# Patient Record
Sex: MALE | Race: WHITE | NOT HISPANIC OR LATINO | Employment: UNEMPLOYED | URBAN - METROPOLITAN AREA
[De-identification: names, ages, dates, MRNs, and addresses within clinical notes are randomized per-mention and may not be internally consistent; named-entity substitution may affect disease eponyms.]

---
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
## 2025-01-06 ENCOUNTER — ANCILLARY PROCEDURE (OUTPATIENT)
Dept: GENERAL RADIOLOGY | Facility: CLINIC | Age: 20
End: 2025-01-06
Attending: NURSE PRACTITIONER

## 2025-01-06 ENCOUNTER — OFFICE VISIT (OUTPATIENT)
Dept: FAMILY MEDICINE | Facility: CLINIC | Age: 20
End: 2025-01-06

## 2025-01-06 ENCOUNTER — VIRTUAL VISIT (OUTPATIENT)
Dept: FAMILY MEDICINE | Facility: CLINIC | Age: 20
End: 2025-01-06

## 2025-01-06 ENCOUNTER — TELEPHONE (OUTPATIENT)
Dept: PEDIATRICS | Facility: CLINIC | Age: 20
End: 2025-01-06

## 2025-01-06 VITALS
HEART RATE: 79 BPM | BODY MASS INDEX: 16.91 KG/M2 | SYSTOLIC BLOOD PRESSURE: 110 MMHG | OXYGEN SATURATION: 97 % | TEMPERATURE: 99.1 F | WEIGHT: 135 LBS | DIASTOLIC BLOOD PRESSURE: 70 MMHG | RESPIRATION RATE: 22 BRPM

## 2025-01-06 DIAGNOSIS — R06.02 SHORTNESS OF BREATH: ICD-10-CM

## 2025-01-06 DIAGNOSIS — F17.290 VAPING NICOTINE DEPENDENCE, TOBACCO PRODUCT: ICD-10-CM

## 2025-01-06 DIAGNOSIS — J22 LOWER RESPIRATORY INFECTION: ICD-10-CM

## 2025-01-06 DIAGNOSIS — A37.90 PERTUSSIS-LIKE SYNDROME: ICD-10-CM

## 2025-01-06 DIAGNOSIS — J22 LOWER RESPIRATORY INFECTION: Primary | ICD-10-CM

## 2025-01-06 DIAGNOSIS — R05.1 ACUTE COUGH: Primary | ICD-10-CM

## 2025-01-06 PROCEDURE — 71046 X-RAY EXAM CHEST 2 VIEWS: CPT | Mod: TC | Performed by: RADIOLOGY

## 2025-01-06 PROCEDURE — 99207 PR NO CHARGE LOS: CPT | Mod: 95 | Performed by: NURSE PRACTITIONER

## 2025-01-06 PROCEDURE — 99214 OFFICE O/P EST MOD 30 MIN: CPT | Performed by: NURSE PRACTITIONER

## 2025-01-06 RX ORDER — PREDNISONE 20 MG/1
20 TABLET ORAL DAILY
Qty: 5 TABLET | Refills: 0 | Status: SHIPPED | OUTPATIENT
Start: 2025-01-06

## 2025-01-06 RX ORDER — AZITHROMYCIN 250 MG/1
TABLET, FILM COATED ORAL
Qty: 6 TABLET | Refills: 0 | Status: CANCELLED | OUTPATIENT
Start: 2025-01-06 | End: 2025-01-11

## 2025-01-06 RX ORDER — AZITHROMYCIN 250 MG/1
TABLET, FILM COATED ORAL
Qty: 6 TABLET | Refills: 0 | Status: SHIPPED | OUTPATIENT
Start: 2025-01-06 | End: 2025-01-11

## 2025-01-06 ASSESSMENT — ENCOUNTER SYMPTOMS
CHILLS: 0
FEVER: 0
WHEEZING: 1
COUGH: 1
SHORTNESS OF BREATH: 1
SORE THROAT: 0

## 2025-01-06 ASSESSMENT — PAIN SCALES - GENERAL: PAINLEVEL_OUTOF10: SEVERE PAIN (6)

## 2025-01-06 NOTE — PROGRESS NOTES
"  Assessment & Plan     1. Lower respiratory infection (Primary)    Nasal congestion and resp symptoms x 2 weeks.    Patient afebrile, ill-appearing but in NAD. Cervical adenopathy and LLL wheezing/rales noted.    CXR- No focal PNA noted. Appears c/w 2022 CXR. Awaiting radiologist review.      In the interim, start azithromycin and prednisone burst. I discussed with the patient/mother risks and benefits of the new medication prescribed including potential side effects.  The patient/mother had opportunity to ask questions and is comfortable with and interested in medications as prescribed.           - XR Chest 2 Views; Future  - azithromycin (ZITHROMAX) 250 MG tablet; Take 2 tablets (500 mg) by mouth daily for 1 day, THEN 1 tablet (250 mg) daily for 4 days.  Dispense: 6 tablet; Refill: 0  - predniSONE (DELTASONE) 20 MG tablet; Take 1 tablet (20 mg) by mouth daily.  Dispense: 5 tablet; Refill: 0    2. Vaping nicotine dependence, tobacco product  Pt interested but not ready to quit today, encouraged cessation.  Provided [place] [employer]uit [employer] info in AVS.    Follow-up if no improvement/symptoms worsen.    All questions/concerns addressed. Patient/mother stated understanding/agreement to plan of care.        Note: Chart documentation was done in part with Dragon Voice Recognition software.  Although reviewed after completion, some word and grammatical errors may remain. Please contact author for any clarification or concerns.              Felix Lange is a 19 year old, presenting for the following health issues:  Chronic Cough (X 2 weeks)      1/6/2025     1:26 PM   Additional Questions   Roomed by Yulissa   Accompanied by Mom Dary and brothers         1/6/2025     1:26 PM   Patient Reported Additional Medications   Patient reports taking the following new medications none     HPI       From earlier telehealth visit:  \"Acute Illness  Acute illness concerns: Cough -- started with losing voice  Onset/Duration: x2-2.5 " "weeks  Symptoms:  Fever: No  Chills/Sweats: No  Headache (location?): YES- from coughing hard which cramps up neck  Sinus Pressure: YES- only when wakes up  Conjunctivitis:  YES- dry  Ear Pain: no  Rhinorrhea: YES- after coughing  Congestion: YES  Sore Throat: YES- from coughing hard  Cough: YES-productive of yellow sputum, productive of green sputum - no clear thick and chunky. Mom states sleeps in the night - whole body cough  Wheeze: YES- when doesn't move around - when sits and watches movies or playing video games  Decreased Appetite: YES- little less  Nausea: No  Vomiting: No  Diarrhea: No  Dysuria/Freq.: No  Dysuria or Hematuria: No  Fatigue/Achiness: YES- wakes exhausted and very ahey after coughing  Sick/Strep Exposure: YES- little brother  Therapies tried and outcome: Mucinex D, stopped taking 3 days ago - had more energy and less coughing when took. COugh and flu med - one day helped fr 2-3 hours.     Repetitive hacking especially in the middle of the night per mother's report.     Patient describes a chest tightness and shortness of breath with coughing fits.\"    Cough x 2 weeks and thick mucus and phlegm   No home COVID tests.  Mother also sick, exposed to coworker with PNA. No known strep exposure.  No hx asthma. Endorses vaping. Interested in quitting.  No trauma or injury to back. Pt/mother believes upper back/neck pain 2/2 coughing. No other acute concerns/symptoms at time of exam.      Review of Systems   Constitutional:  Negative for chills and fever.   HENT:  Positive for congestion. Negative for sore throat.    Respiratory:  Positive for cough, shortness of breath and wheezing.    Cardiovascular:  Negative for chest pain.   Constitutional, HEENT, cardiovascular, pulmonary, gi and gu systems are negative, except as otherwise noted.    No current outpatient medications on file.     No current facility-administered medications for this visit.               Objective      /70 (BP Location: " Left arm, Patient Position: Sitting, Cuff Size: Adult Regular)   Pulse 79   Temp 99.1  F (37.3  C) (Temporal)   Resp 22   Wt 61.2 kg (135 lb)   SpO2 97%   BMI 16.91 kg/m        Physical Exam  Constitutional:       General: He is not in acute distress.     Appearance: He is ill-appearing.   HENT:      Right Ear: Tympanic membrane normal.      Left Ear: Tympanic membrane normal.      Nose: No rhinorrhea.      Mouth/Throat:      Pharynx: Oropharynx is clear.   Eyes:      Extraocular Movements: Extraocular movements intact.      Conjunctiva/sclera: Conjunctivae normal.      Pupils: Pupils are equal, round, and reactive to light.   Cardiovascular:      Rate and Rhythm: Normal rate and regular rhythm.      Heart sounds: Normal heart sounds. No murmur heard.  Pulmonary:      Effort: Pulmonary effort is normal.      Breath sounds: Examination of the left-lower field reveals wheezing and rales. Wheezing and rales present.   Musculoskeletal:      Cervical back: Neck supple. No rigidity or torticollis. No spinous process tenderness. Normal range of motion.   Lymphadenopathy:      Cervical: Cervical adenopathy present.   Neurological:      General: No focal deficit present.      Mental Status: He is alert.   Psychiatric:         Thought Content: Thought content normal.         Judgment: Judgment normal.        CXR- No focal PNA noted. Appears c/w 2022 CXR. Awaiting radiologist review.        Signed Electronically by: MINERVA Caldwell CNP

## 2025-01-06 NOTE — PROGRESS NOTES
Nazario is a 19 year old who is being evaluated via a billable video visit.    How would you like to obtain your AVS? MyChart  If the video visit is dropped, the invitation should be resent by: Text to cell phone: [phone]  Will anyone else be joining your video visit? No    Assessment & Plan     Acute cough  Pertussis-like syndrome  Shortness of breath  Sounds like it could be a pertussis like syndrome or atypical pneumonia excetra with high levels of these noted in the area.    However with him reporting chest tightness and shortness of breath in person exam is recommended to rule out pneumonia listen to chest excetra.    Appointment made for him in his area today.  No charge for this virtual visit.  Nazario verbalizes understanding of plan of care and is in agreement.          Subjective   Nazario is a 19 year old, presenting for the following health issues:  Cough      1/6/2025     9:27 AM   Additional Questions   Roomed by Eloy ADEN   Accompanied by Mom Sintia CHOI     Acute Illness  Acute illness concerns: Cough -- started with losing voice  Onset/Duration: x2-2.5 weeks  Symptoms:  Fever: No  Chills/Sweats: No  Headache (location?): YES- from coughing hard which cramps up neck  Sinus Pressure: YES- only when wakes up  Conjunctivitis:  YES- dry  Ear Pain: no  Rhinorrhea: YES- after coughing  Congestion: YES  Sore Throat: YES- from coughing hard  Cough: YES-productive of yellow sputum, productive of green sputum - no clear thick and chunky. Mom states sleeps in the night - whole body cough  Wheeze: YES- when doesn't move around - when sits and watches movies or playing video games  Decreased Appetite: YES- little less  Nausea: No  Vomiting: No  Diarrhea: No  Dysuria/Freq.: No  Dysuria or Hematuria: No  Fatigue/Achiness: YES- wakes exhausted and very ahey after coughing  Sick/Strep Exposure: YES- little brother  Therapies tried and outcome: Mucinex D, stopped taking 3 days ago - had more energy and less coughing  when took. COugh and flu med - one day helped fr 2-3 hours.    Repetitive hacking especially in the middle of the night per mother's report.    Patient describes a chest tightness and shortness of breath with coughing fits.    Constitutional, HEENT, cardiovascular, pulmonary, GI, , musculoskeletal, neuro, skin, endocrine and psych systems are negative, except as otherwise noted in the HPI.       Objective           Vitals:  No vitals were obtained today due to virtual visit.    Physical Exam   GENERAL: alert and no distress  EYES: Eyes grossly normal to inspection.  No discharge or erythema, or obvious scleral/conjunctival abnormalities.  RESP: No audible wheeze, cough, or visible cyanosis.    SKIN: Visible skin clear. No significant rash, abnormal pigmentation or lesions.  NEURO: Cranial nerves grossly intact.  Mentation and speech appropriate for age.  PSYCH: Appropriate affect, tone, and pace of words

## 2025-01-06 NOTE — TELEPHONE ENCOUNTER
From Amanda Gong NP  -  Virtual visit from [place]; upper respiratory x 2 weeks but having chest tightness and shortness of breath needs in person visit to set up Can we please get him into [facility] or [place] area in clinic     Attempt # 1    Called #   Telephone Information:   Mobile [phone]         Left a non detailed VM     Called [phone]     Made an OV for today   Appointments in Next Year      Jan 06, 2025 9:30 AM  (Arrive by 9:25 AM)  Provider Visit with MINERVA Louie CNP  [facility] ([facility] ) [phone]     Jan 06, 2025 1:30 PM  (Arrive by 1:10 PM)  Provider Visit with MINERVA Rahman CNP  [facility] ([facility]) [phone]                Ruma Sinclair RN, BSN  [facility]

## 2025-01-06 NOTE — TELEPHONE ENCOUNTER
Patient's mother called (consent on file) stating this patient has a virtual visit, but they have been waiting for 15 minutes past their appointment time. They had been informed the provider would be late, but she wanted to call to check in on tentative timing. During the conversation, the provider got onto the phone and our call was ended.    Deon Barros RN  [facility]

## 2025-01-18 ENCOUNTER — HEALTH MAINTENANCE LETTER (OUTPATIENT)
Age: 20
End: 2025-01-18